# Patient Record
Sex: MALE | Employment: FULL TIME | URBAN - METROPOLITAN AREA
[De-identification: names, ages, dates, MRNs, and addresses within clinical notes are randomized per-mention and may not be internally consistent; named-entity substitution may affect disease eponyms.]

---

## 2021-05-14 ENCOUNTER — LAB ENCOUNTER (OUTPATIENT)
Dept: LAB | Age: 61
End: 2021-05-14
Attending: OTOLARYNGOLOGY
Payer: COMMERCIAL

## 2021-05-14 DIAGNOSIS — Z01.818 PREOP EXAMINATION: ICD-10-CM

## 2021-05-14 DIAGNOSIS — Z11.59 ENCOUNTER FOR SCREENING FOR OTHER VIRAL DISEASES: ICD-10-CM

## 2021-05-17 ENCOUNTER — HOSPITAL ENCOUNTER (OUTPATIENT)
Dept: ULTRASOUND IMAGING | Facility: HOSPITAL | Age: 61
Discharge: HOME | End: 2021-05-17
Attending: OTOLARYNGOLOGY
Payer: COMMERCIAL

## 2021-05-17 DIAGNOSIS — E04.2 MULTINODULAR GOITER: ICD-10-CM

## 2021-05-17 PROCEDURE — 88173 CYTOPATH EVAL FNA REPORT: CPT | Performed by: OTOLARYNGOLOGY

## 2021-05-17 PROCEDURE — 10005 FNA BX W/US GDN 1ST LES: CPT | Performed by: OTOLARYNGOLOGY

## 2021-05-17 NOTE — PROCEDURES
PROCEDURE: US FNA THYROID, GUIDED INCLD, FIRST LESION (CPT=10005)    COMPARISON: Outside CT from 4/14/2021.     INDICATIONS: E04.2 Multinodular goiter    DESCRIPTION: The risks, benefits, and alternatives of the procedure were explained to the patient and w

## 2021-05-19 ENCOUNTER — OFFICE VISIT (OUTPATIENT)
Dept: HEMATOLOGY/ONCOLOGY | Facility: HOSPITAL | Age: 61
End: 2021-05-19
Attending: THORACIC SURGERY (CARDIOTHORACIC VASCULAR SURGERY)
Payer: COMMERCIAL

## 2021-05-19 VITALS
WEIGHT: 261 LBS | RESPIRATION RATE: 18 BRPM | TEMPERATURE: 98 F | HEART RATE: 83 BPM | OXYGEN SATURATION: 98 % | DIASTOLIC BLOOD PRESSURE: 84 MMHG | SYSTOLIC BLOOD PRESSURE: 154 MMHG

## 2021-05-19 PROCEDURE — 99211 OFF/OP EST MAY X REQ PHY/QHP: CPT

## 2021-05-19 NOTE — CONSULTS
Thoracic Surgery Consult     Name: Sherwin Lunsford   Age: 63year old   Sex: male.    MRN: [id number]    Reason for Consultation: Sternotomy for thyroid removal    Consulting Physician: Suzanne Rose    Subjective:     CC: Sternotomy for thyroid remova Other Topics      Concerns:        Not on file    Social History Narrative      Not on file    Social Determinants of Health  Financial Resource Strain:       Difficulty of Paying Living Expenses:   Food Insecurity:       Worried About Running Out of Food sensation  Psych:  oriented to person place and time, normal mood and affect      Labs:   No results found for: WBC, HGB, HCT, PLT, BAND, MCV  Lab Results   Component Value Date/Time    BUN 19.0 04/14/2021 02:29 PM     No results found for: INR, PT, PTT  N any aspect of the mass that travels behind the sternum. If there is any difficulty in removing the mass from his neck incision, I told  Paula Miriam that he may need a mini-sternotomy for additional exposure.   I went over what this would involve as well as

## 2021-06-03 PROBLEM — E04.9 SUBSTERNAL THYROID GOITER: Status: ACTIVE | Noted: 2021-06-03
